# Patient Record
Sex: MALE | Race: WHITE | URBAN - METROPOLITAN AREA
[De-identification: names, ages, dates, MRNs, and addresses within clinical notes are randomized per-mention and may not be internally consistent; named-entity substitution may affect disease eponyms.]

---

## 2017-05-01 ENCOUNTER — OFFICE VISIT (OUTPATIENT)
Dept: FAMILY MEDICINE | Facility: CLINIC | Age: 22
End: 2017-05-01
Payer: COMMERCIAL

## 2017-05-01 VITALS
HEART RATE: 84 BPM | SYSTOLIC BLOOD PRESSURE: 116 MMHG | WEIGHT: 174.4 LBS | DIASTOLIC BLOOD PRESSURE: 78 MMHG | BODY MASS INDEX: 22.38 KG/M2 | HEIGHT: 74 IN | TEMPERATURE: 97.7 F | OXYGEN SATURATION: 98 %

## 2017-05-01 DIAGNOSIS — R36.9 PENILE DISCHARGE: Primary | ICD-10-CM

## 2017-05-01 PROBLEM — F90.9 ADHD (ATTENTION DEFICIT HYPERACTIVITY DISORDER): Status: ACTIVE | Noted: 2017-05-01

## 2017-05-01 LAB — HIV 1+2 AB+HIV1 P24 AG SERPL QL IA: NORMAL

## 2017-05-01 PROCEDURE — 87591 N.GONORRHOEAE DNA AMP PROB: CPT | Performed by: INTERNAL MEDICINE

## 2017-05-01 PROCEDURE — 96372 THER/PROPH/DIAG INJ SC/IM: CPT | Performed by: INTERNAL MEDICINE

## 2017-05-01 PROCEDURE — 99203 OFFICE O/P NEW LOW 30 MIN: CPT | Performed by: INTERNAL MEDICINE

## 2017-05-01 PROCEDURE — 36415 COLL VENOUS BLD VENIPUNCTURE: CPT | Performed by: INTERNAL MEDICINE

## 2017-05-01 PROCEDURE — 87491 CHLMYD TRACH DNA AMP PROBE: CPT | Performed by: INTERNAL MEDICINE

## 2017-05-01 PROCEDURE — 87389 HIV-1 AG W/HIV-1&-2 AB AG IA: CPT | Performed by: INTERNAL MEDICINE

## 2017-05-01 RX ORDER — CEFTRIAXONE SODIUM 250 MG/1
250 INJECTION, POWDER, FOR SOLUTION INTRAMUSCULAR; INTRAVENOUS ONCE
Qty: 2.5 ML | Refills: 0 | OUTPATIENT
Start: 2017-05-01 | End: 2017-05-01

## 2017-05-01 RX ORDER — AZITHROMYCIN 1 G/1
1 POWDER, FOR SUSPENSION ORAL ONCE
Qty: 1 EACH | Refills: 0 | Status: SHIPPED | OUTPATIENT
Start: 2017-05-01 | End: 2017-05-01

## 2017-05-01 RX ORDER — DEXTROAMPHETAMINE SACCHARATE, AMPHETAMINE ASPARTATE, DEXTROAMPHETAMINE SULFATE AND AMPHETAMINE SULFATE 5; 5; 5; 5 MG/1; MG/1; MG/1; MG/1
TABLET ORAL
COMMUNITY
Start: 2017-04-20

## 2017-05-01 NOTE — MR AVS SNAPSHOT
"              After Visit Summary   2017    Rudi Smith    MRN: [id number]           Patient Information     Date Of Birth          1995        Visit Information        Provider Department      2017 9:20 AM Lizzy Yepez MD [facility] [place]        Today's Diagnoses     Penile discharge    -  1       Follow-ups after your visit        Who to contact     If you have questions or need follow up information about today's clinic visit or your schedule please contact [facility] [place] directly at [phone].  Normal or non-critical lab and imaging results will be communicated to you by [employer] Powerhart, letter or phone within 4 business days after the clinic has received the results. If you do not hear from us within 7 days, please contact the clinic through [employer] Powerhart or phone. If you have a critical or abnormal lab result, we will notify you by phone as soon as possible.  Submit refill requests through [employer]By[employer] or call your pharmacy and they will forward the refill request to us. Please allow 3 business days for your refill to be completed.          Additional Information About Your Visit        MyChart Information     [employer] lets you send messages to your doctor, view your test results, renew your prescriptions, schedule appointments and more. To sign up, go to [email or web address] . Click on \"Log in\" on the left side of the screen, which will take you to the Welcome page. Then click on \"Sign up Now\" on the right side of the page.     You will be asked to enter the access code listed below, as well as some personal information. Please follow the directions to create your username and password.     Your access code is: [email or web address]  Expires: 2017  9:55 AM     Your access code will  in 90 days. If you need help or a new code, please call your [facility] or [phone].        Care EveryWhere ID     This is your Care EveryWhere ID. This could be used by other " "organizations to access your [facility] medical records  [id number]        Your Vitals Were     Pulse Temperature Height Pulse Oximetry BMI (Body Mass Index)       84 97.7  F (36.5  C) (Tympanic) 6' 2\" (1.88 m) 98% 22.39 kg/m2        Blood Pressure from Last 3 Encounters:   05/01/17 116/78    Weight from Last 3 Encounters:   05/01/17 174 lb 6.4 oz (79.1 kg)              We Performed the Following     Chlamydia trachomatis PCR     HIV Antigen Antibody Combo     Neisseria gonorrhoeae PCR          Today's Medication Changes          These changes are accurate as of: 5/1/17  9:55 AM.  If you have any questions, ask your nurse or doctor.               Start taking these medicines.        Dose/Directions    azithromycin 1 G powder   Commonly known as:  ZITHROMAX   Used for:  Penile discharge   Started by:  Lizzy Yepez MD        Dose:  1 packet   Take 1 packet (1 g) by mouth once for 1 dose   Quantity:  1 each   Refills:  0       cefTRIAXone 250 MG injection   Commonly known as:  ROCEPHIN   Used for:  Penile discharge   Started by:  Lizzy Yepez MD        Dose:  250 mg   Inject 250 mg into the muscle once for 1 dose   Quantity:  2.5 mL   Refills:  0            Where to get your medicines      These medications were sent to [facility] [place], [place] [address] [facility] [address]     Phone:  [phone]     azithromycin 1 G powder         Some of these will need a paper prescription and others can be bought over the counter.  Ask your nurse if you have questions.     You don't need a prescription for these medications     cefTRIAXone 250 MG injection                Primary Care Provider    None Specified       No primary provider on file.        Thank you!     [facility]. Our goal is always to provide you with excellent care. Hearing back from our patients is one way we can " continue to improve our services. Please take a few minutes to complete the written survey that you may receive in the mail after your visit with us. Thank you!             Your Updated Medication List - Protect others around you: Learn how to safely use, store and throw away your medicines at www.disposemymeds.org.          This list is accurate as of: 5/1/17  9:55 AM.  Always use your most recent med list.                   Brand Name Dispense Instructions for use    amphetamine-dextroamphetamine 20 MG per tablet    ADDERALL         azithromycin 1 G powder    ZITHROMAX    1 each    Take 1 packet (1 g) by mouth once for 1 dose       cefTRIAXone 250 MG injection    ROCEPHIN    2.5 mL    Inject 250 mg into the muscle once for 1 dose

## 2017-05-01 NOTE — PROGRESS NOTES
"  SUBJECTIVE:                                                    Rudi Smith is a 21 year old male who presents to clinic today for the following health issues:      Concern - Penile discharge     Onset: 2 days    Rudi is here with penile discharge.  It is clear to cloudy yellow/greenish.  He has some slight burning and discomfort with urination.  He has been monogamous with his girlfriend for the past several months; she is asymptomatic.  He denies fevers, chills, nausea, abdominal pain, inguinal LAD, and genital lesions.  Had STD testing after HS which was negative, has not been tested since.  He does have some red bumps on his buttocks - not painful or itchy, has gotten those in the past.     He just moved to [place] from [place] - arrived last night.  He will be working for a [employer] company here over the summer.            Reviewed and updated as needed this visit by clinical staff  Tobacco  Allergies  Meds  Problems  Soc Hx      Reviewed and updated as needed this visit by Provider  Meds  Problems         ROS:  Const, skin, lymph, GI, and  reviewed, negative unless noted above.     OBJECTIVE:                                                    /78 (BP Location: Right arm, Cuff Size: Adult Regular)  Pulse 84  Temp 97.7  F (36.5  C) (Tympanic)  Ht 6' 2\" (1.88 m)  Wt 174 lb 6.4 oz (79.1 kg)  SpO2 98%  BMI 22.39 kg/m2  Body mass index is 22.39 kg/(m^2).    Gen: pleasant, well appearing young man, no distress  Skin: few scattered erythematous papules over the buttocks.     Diagnostic Test Results:  In process     ASSESSMENT/PLAN:                                                        1. Penile discharge  Will treat empirically for chlamydia and gonorrhea.  Sent dirty UA for testing.  Also agreed to HIV screening today.  Reassured that rash on buttocks is unlikely related to penile discharge, likely from irritation/chaffing from recent move and long drive.   - Chlamydia " trachomatis PCR  - Neisseria gonorrhoeae PCR  - HIV Antigen Antibody Combo  - cefTRIAXone (ROCEPHIN) 250 MG injection; Inject 250 mg into the muscle once for 1 dose  Dispense: 2.5 mL; Refill: 0  - azithromycin (ZITHROMAX) 1 G powder; Take 1 packet (1 g) by mouth once for 1 dose  Dispense: 1 each; Refill: 0  - CEFTRIAXONE NA INJ /250MG  - INJECTION INTRAMUSCULAR OR SUB-Q    Rudi would like phone call with lab results. Okay to leave a message.     MD Lizzy Hassan MD  [facility]

## 2017-05-01 NOTE — NURSING NOTE
"Chief Complaint   Patient presents with     Penile Discharge       Initial /78 (BP Location: Right arm, Cuff Size: Adult Regular)  Pulse 84  Temp 97.7  F (36.5  C) (Tympanic)  Ht 6' 2\" (1.88 m)  Wt 174 lb 6.4 oz (79.1 kg)  SpO2 98%  BMI 22.39 kg/m2 Estimated body mass index is 22.39 kg/(m^2) as calculated from the following:    Height as of this encounter: 6' 2\" (1.88 m).    Weight as of this encounter: 174 lb 6.4 oz (79.1 kg).  Medication Reconciliation: complete  "

## 2017-05-02 LAB
C TRACH DNA SPEC QL NAA+PROBE: NORMAL
N GONORRHOEA DNA SPEC QL NAA+PROBE: NORMAL
SPECIMEN SOURCE: NORMAL
SPECIMEN SOURCE: NORMAL

## 2020-03-10 ENCOUNTER — HEALTH MAINTENANCE LETTER (OUTPATIENT)
Age: 25
End: 2020-03-10

## 2020-12-27 ENCOUNTER — HEALTH MAINTENANCE LETTER (OUTPATIENT)
Age: 25
End: 2020-12-27

## 2021-04-24 ENCOUNTER — HEALTH MAINTENANCE LETTER (OUTPATIENT)
Age: 26
End: 2021-04-24

## 2021-10-09 ENCOUNTER — HEALTH MAINTENANCE LETTER (OUTPATIENT)
Age: 26
End: 2021-10-09

## 2022-05-21 ENCOUNTER — HEALTH MAINTENANCE LETTER (OUTPATIENT)
Age: 27
End: 2022-05-21

## 2022-09-11 ENCOUNTER — HEALTH MAINTENANCE LETTER (OUTPATIENT)
Age: 27
End: 2022-09-11

## 2023-06-03 ENCOUNTER — HEALTH MAINTENANCE LETTER (OUTPATIENT)
Age: 28
End: 2023-06-03